# Patient Record
Sex: MALE | Race: BLACK OR AFRICAN AMERICAN | NOT HISPANIC OR LATINO | URBAN - METROPOLITAN AREA
[De-identification: names, ages, dates, MRNs, and addresses within clinical notes are randomized per-mention and may not be internally consistent; named-entity substitution may affect disease eponyms.]

---

## 2017-07-27 ENCOUNTER — HOSPITAL ENCOUNTER (EMERGENCY)
Facility: CLINIC | Age: 27
Discharge: HOME | End: 2017-07-27
Attending: PSYCHIATRY & NEUROLOGY | Admitting: PSYCHIATRY & NEUROLOGY
Payer: MEDICAID

## 2017-07-27 VITALS
DIASTOLIC BLOOD PRESSURE: 71 MMHG | RESPIRATION RATE: 16 BRPM | SYSTOLIC BLOOD PRESSURE: 117 MMHG | HEART RATE: 82 BPM | TEMPERATURE: 97.6 F | OXYGEN SATURATION: 99 %

## 2017-07-27 DIAGNOSIS — F19.11 HISTORY OF SUBSTANCE ABUSE (H): ICD-10-CM

## 2017-07-27 DIAGNOSIS — F29 ATYPICAL PSYCHOSIS (H): ICD-10-CM

## 2017-07-27 DIAGNOSIS — F69 BEHAVIOR PROBLEM, ADULT: ICD-10-CM

## 2017-07-27 DIAGNOSIS — Z59.00 HOMELESS: ICD-10-CM

## 2017-07-27 DIAGNOSIS — Z59.00 LACK OF HOUSING: ICD-10-CM

## 2017-07-27 DIAGNOSIS — R46.89 BEHAVIOR CONCERN: ICD-10-CM

## 2017-07-27 PROCEDURE — 99284 EMERGENCY DEPT VISIT MOD MDM: CPT | Mod: Z6 | Performed by: PSYCHIATRY & NEUROLOGY

## 2017-07-27 PROCEDURE — 99285 EMERGENCY DEPT VISIT HI MDM: CPT | Mod: 25 | Performed by: PSYCHIATRY & NEUROLOGY

## 2017-07-27 PROCEDURE — 90791 PSYCH DIAGNOSTIC EVALUATION: CPT

## 2017-07-27 SDOH — ECONOMIC STABILITY - HOUSING INSECURITY: HOMELESSNESS UNSPECIFIED: Z59.00

## 2017-07-27 ASSESSMENT — ENCOUNTER SYMPTOMS
EYES NEGATIVE: 1
DECREASED CONCENTRATION: 1
NEUROLOGICAL NEGATIVE: 1
MUSCULOSKELETAL NEGATIVE: 1
HEMATOLOGIC/LYMPHATIC NEGATIVE: 1
CONSTITUTIONAL NEGATIVE: 1
RESPIRATORY NEGATIVE: 1
HALLUCINATIONS: 0
CARDIOVASCULAR NEGATIVE: 1
GASTROINTESTINAL NEGATIVE: 1
SLEEP DISTURBANCE: 1
HYPERACTIVE: 0
ENDOCRINE NEGATIVE: 1

## 2017-07-27 NOTE — ED AVS SNAPSHOT
[facility], [place], Emergency Department    [address]    [facility] [address]    Phone:  [phone]    Fax:  [phone]                                       Umu Blackman   MRN: [id number]    Department:  [facility], Emergency Department   Date of Visit:  7/27/2017           After Visit Summary Signature Page     I have received my discharge instructions, and my questions have been answered. I have discussed any challenges I see with this plan with the nurse or doctor.    ..........................................................................................................................................  Patient/Patient Representative Signature      ..........................................................................................................................................  Patient Representative Print Name and Relationship to Patient    ..................................................               ................................................  Date                                            Time    ..........................................................................................................................................  Reviewed by Signature/Title    ...................................................              ..............................................  Date                                                            Time

## 2017-07-27 NOTE — ED PROVIDER NOTES
History     Chief Complaint   Patient presents with     Psychiatric Evaluation     pt sent her from shelter for eval of psychosis. not taking      The history is provided by the patient.     Umu Blackman is a 27 year old male who is here via police as he was picked up for trespassing, and was booked in shelter. He was observed overnight and felt to be psychotic.  [facility] felt he should be evaluated at an ER. Patient admits to history of being on Zyprexa but has not been on it for 6 months. He was last hospitalized at [facility] in March 2017. He has historical diagnosed of schizoaffective disorder, antisocial personality disorder and malingering. Patient is homeless. He has history of abusing THC. He reports feeling better since he stopped smoking THC and no longer feels he needs to take any psychotropics and he is feeling better. Patient denies any changes to his sleep or appetite. He does not feel he needs to be here and would like discharge.    Please see DEC Crisis Assessment on 7/27/17 in [facility] for further details.    PERSONAL MEDICAL HISTORY  Past Medical History:   Diagnosis Date     Depressive disorder      PAST SURGICAL HISTORY  History reviewed. No pertinent surgical history.  FAMILY HISTORY  No family history on file.  SOCIAL HISTORY  Social History   Substance Use Topics     Smoking status: Not on file     Smokeless tobacco: Not on file     Alcohol use Not on file     MEDICATIONS  No current facility-administered medications for this encounter.      No current outpatient prescriptions on file.     ALLERGIES  No Known Allergies    I have reviewed the Medications, Allergies, Past Medical and Surgical History, and Social History in the Epic system.    Review of Systems   Constitutional: Negative.    HENT: Negative.    Eyes: Negative.    Respiratory: Negative.    Cardiovascular: Negative.    Gastrointestinal: Negative.    Endocrine: Negative.    Genitourinary: Negative.    Musculoskeletal:  Negative.    Skin: Negative.    Neurological: Negative.    Hematological: Negative.    Psychiatric/Behavioral: Positive for behavioral problems, decreased concentration and sleep disturbance. Negative for hallucinations and suicidal ideas. The patient is not hyperactive.    All other systems reviewed and are negative.      Physical Exam   BP: (!) 125/91  Pulse: 73  Temp: 97.6  F (36.4  C)  Resp: 16  SpO2: 97 %  Physical Exam   Constitutional: He appears well-developed and well-nourished.   malodorous   HENT:   Head: Normocephalic.   Eyes: Pupils are equal, round, and reactive to light.   Neck: Normal range of motion.   Cardiovascular: Normal rate.    Pulmonary/Chest: Effort normal.   Abdominal: Soft.   Musculoskeletal: Normal range of motion.   Neurological: He is alert.   Skin: Skin is warm.   Psychiatric: He has a normal mood and affect. His speech is normal and behavior is normal. Judgment and thought content normal. He is not agitated, not aggressive, not hyperactive, not actively hallucinating and not combative. Thought content is not paranoid and not delusional. Cognition and memory are normal. He expresses no homicidal and no suicidal ideation.   Nursing note and vitals reviewed.      ED Course     ED Course     Procedures    Labs Ordered and Resulted from Time of ED Arrival Up to the Time of Departure from the ED - No data to display         Assessments & Plan (with Medical Decision Making)   Patient with history of being on Zyprexa for alleged schizoaffective disorder and marijuana abuse. Patient got upset for being booked into shelter. He is now in behavioral control and is not exhibiting psychosis (no paranoia nor responding to internal stimuli) nor agitation. There is no justification to hold patient against his will nor force meds on him. He would like to be discharged. He was appreciative of receiving a bus token, preferably two. Patient can be discharged. He is to follow-up established care and  services.    I have reviewed the nursing notes.    I have reviewed the findings, diagnosis, plan and need for follow up with the patient.    New Prescriptions    No medications on file       Final diagnoses:   Behavior concern   History of substance abuse   Homeless       7/27/2017   [facility], [place], EMERGENCY DEPARTMENT     Luis Seaman MD  07/27/17 7651

## 2017-07-27 NOTE — ED AVS SNAPSHOT
[facility], Emergency Department    [address]    [place] [address]    Phone:  [phone]    Fax:  [phone]                                       Umu Blackman   MRN: [id number]    Department:  [facility], Emergency Department   Date of Visit:  7/27/2017           Patient Information     Date Of Birth          1990        Your diagnoses for this visit were:     Behavior concern     History of substance abuse     Homeless        You were seen by Luis Seaman MD.        Discharge Instructions       Follow-up established care and services    24 Hour Appointment Hotline       To make an appointment at any [facility] clinic, call [phone] ([phone]). If you don't have a family doctor or clinic, we will help you find one. [facility] clinics are conveniently located to serve the needs of you and your family.             Review of your medicines      Notice     You have not been prescribed any medications.            Orders Needing Specimen Collection     None      Pending Results     No orders found from 7/25/2017 to 7/28/2017.            Pending Culture Results     No orders found from 7/25/2017 to 7/28/2017.            Pending Results Instructions     If you had any lab results that were not finalized at the time of your Discharge, you can call the ED Lab Result RN at [phone]. You will be contacted by this team for any positive Lab results or changes in treatment. The nurses are available 7 days a week from 10A to 6:30P.  You can leave a message 24 hours per day and they will return your call.        Thank you for choosing [facility]       Thank you for choosing [facility] for your care. Our goal is always to provide you with excellent care. Hearing back from our patients is one way we can continue to improve our services. Please take a few minutes to complete the written survey that you may receive in the mail after you visit with us. Thank you!        MyChart Information   "   [employer] lets you send messages to your doctor, view your test results, renew your prescriptions, schedule appointments and more. To sign up, go to [email or web address] . Click on \"Log in\" on the left side of the screen, which will take you to the Welcome page. Then click on \"Sign up Now\" on the right side of the page.     You will be asked to enter the access code listed below, as well as some personal information. Please follow the directions to create your username and password.     Your access code is: [email or web address]  Expires: 10/25/2017  2:51 PM     Your access code will  in 90 days. If you need help or a new code, please call your [facility] clinic or [phone].        Care EveryWhere ID     This is your Care EveryWhere ID. This could be used by other organizations to access your [facility] medical records  [id number]        Equal Access to Services     TAMERA DOZIER AH: Hadii fernando García, wamartin alvarez, qajeremiasta kaalmada roz, chichi back . So [facility] [phone].    ATENCIÓN: Si habla español, tiene a bradford disposición servicios gratuitos de asistencia lingüística. Llame al [phone].    We comply with applicable federal civil rights laws and [place] laws. We do not discriminate on the basis of race, color, national origin, age, disability sex, sexual orientation or gender identity.            After Visit Summary       This is your record. Keep this with you and show to your community pharmacist(s) and doctor(s) at your next visit.                  "